# Patient Record
Sex: MALE | Race: WHITE | HISPANIC OR LATINO | Employment: FULL TIME | URBAN - METROPOLITAN AREA
[De-identification: names, ages, dates, MRNs, and addresses within clinical notes are randomized per-mention and may not be internally consistent; named-entity substitution may affect disease eponyms.]

---

## 2018-09-21 ENCOUNTER — HOSPITAL ENCOUNTER (OUTPATIENT)
Dept: CARDIOLOGY | Facility: HOSPITAL | Age: 23
Discharge: HOME | End: 2018-09-21
Attending: INTERNAL MEDICINE

## 2018-09-21 ENCOUNTER — HOSPITAL ENCOUNTER (OUTPATIENT)
Dept: CARDIOLOGY | Facility: HOSPITAL | Age: 23
Discharge: HOME | End: 2018-09-21
Admitting: INTERNAL MEDICINE

## 2018-09-21 ENCOUNTER — TELEPHONE (OUTPATIENT)
Dept: FAMILY MEDICINE CLINIC | Facility: CLINIC | Age: 23
End: 2018-09-21

## 2018-09-21 ENCOUNTER — OFFICE VISIT (OUTPATIENT)
Dept: FAMILY MEDICINE CLINIC | Facility: CLINIC | Age: 23
End: 2018-09-21

## 2018-09-21 VITALS
DIASTOLIC BLOOD PRESSURE: 76 MMHG | BODY MASS INDEX: 31.26 KG/M2 | HEIGHT: 70 IN | OXYGEN SATURATION: 97 % | HEART RATE: 64 BPM | TEMPERATURE: 98.1 F | RESPIRATION RATE: 16 BRPM | SYSTOLIC BLOOD PRESSURE: 110 MMHG | WEIGHT: 218.4 LBS

## 2018-09-21 VITALS — WEIGHT: 218 LBS | BODY MASS INDEX: 31.21 KG/M2 | HEIGHT: 70 IN

## 2018-09-21 VITALS
OXYGEN SATURATION: 99 % | DIASTOLIC BLOOD PRESSURE: 81 MMHG | HEART RATE: 68 BPM | SYSTOLIC BLOOD PRESSURE: 122 MMHG | RESPIRATION RATE: 16 BRPM

## 2018-09-21 DIAGNOSIS — R06.02 SHORTNESS OF BREATH: ICD-10-CM

## 2018-09-21 DIAGNOSIS — F41.9 ANXIETY: ICD-10-CM

## 2018-09-21 DIAGNOSIS — R07.9 CHEST PAIN, UNSPECIFIED TYPE: ICD-10-CM

## 2018-09-21 DIAGNOSIS — R06.09 OTHER FORM OF DYSPNEA: Primary | ICD-10-CM

## 2018-09-21 DIAGNOSIS — R06.02 SOB (SHORTNESS OF BREATH) ON EXERTION: Primary | ICD-10-CM

## 2018-09-21 DIAGNOSIS — R01.1 MURMUR: ICD-10-CM

## 2018-09-21 PROBLEM — R06.00 DYSPNEA: Status: ACTIVE | Noted: 2018-09-21

## 2018-09-21 LAB
ALBUMIN SERPL-MCNC: 4.9 G/DL (ref 3.5–5.2)
ALBUMIN/GLOB SERPL: 1.6 G/DL
ALP SERPL-CCNC: 79 U/L (ref 39–117)
ALT SERPL W P-5'-P-CCNC: 19 U/L (ref 1–41)
ANION GAP SERPL CALCULATED.3IONS-SCNC: 9.2 MMOL/L
ASCENDING AORTA: 2.8 CM
AST SERPL-CCNC: 18 U/L (ref 1–40)
BH CV ECHO MEAS - ACS: 2.5 CM
BH CV ECHO MEAS - AO MAX PG (FULL): 1.3 MMHG
BH CV ECHO MEAS - AO MAX PG: 4.8 MMHG
BH CV ECHO MEAS - AO MEAN PG (FULL): 1.1 MMHG
BH CV ECHO MEAS - AO MEAN PG: 2.9 MMHG
BH CV ECHO MEAS - AO ROOT AREA (BSA CORRECTED): 1.6
BH CV ECHO MEAS - AO ROOT AREA: 8.9 CM^2
BH CV ECHO MEAS - AO ROOT DIAM: 3.4 CM
BH CV ECHO MEAS - AO V2 MAX: 109.2 CM/SEC
BH CV ECHO MEAS - AO V2 MEAN: 82.6 CM/SEC
BH CV ECHO MEAS - AO V2 VTI: 26.2 CM
BH CV ECHO MEAS - AVA(I,A): 3.3 CM^2
BH CV ECHO MEAS - AVA(I,D): 3.3 CM^2
BH CV ECHO MEAS - AVA(V,A): 4 CM^2
BH CV ECHO MEAS - AVA(V,D): 4 CM^2
BH CV ECHO MEAS - BSA(HAYCOCK): 2.2 M^2
BH CV ECHO MEAS - BSA: 2.2 M^2
BH CV ECHO MEAS - BZI_BMI: 31.3 KILOGRAMS/M^2
BH CV ECHO MEAS - BZI_METRIC_HEIGHT: 177.8 CM
BH CV ECHO MEAS - BZI_METRIC_WEIGHT: 98.9 KG
BH CV ECHO MEAS - EDV(MOD-SP2): 159 ML
BH CV ECHO MEAS - EDV(MOD-SP4): 125 ML
BH CV ECHO MEAS - EDV(TEICH): 165.9 ML
BH CV ECHO MEAS - EF(CUBED): 76.7 %
BH CV ECHO MEAS - EF(MOD-BP): 57 %
BH CV ECHO MEAS - EF(MOD-SP2): 56 %
BH CV ECHO MEAS - EF(MOD-SP4): 58.4 %
BH CV ECHO MEAS - EF(TEICH): 68 %
BH CV ECHO MEAS - ESV(MOD-SP2): 70 ML
BH CV ECHO MEAS - ESV(MOD-SP4): 52 ML
BH CV ECHO MEAS - ESV(TEICH): 53 ML
BH CV ECHO MEAS - FS: 38.5 %
BH CV ECHO MEAS - IVS/LVPW: 1
BH CV ECHO MEAS - IVSD: 0.93 CM
BH CV ECHO MEAS - LAT PEAK E' VEL: 19 CM/SEC
BH CV ECHO MEAS - LV DIASTOLIC VOL/BSA (35-75): 57.7 ML/M^2
BH CV ECHO MEAS - LV MASS(C)D: 212 GRAMS
BH CV ECHO MEAS - LV MASS(C)DI: 97.9 GRAMS/M^2
BH CV ECHO MEAS - LV MAX PG: 3.5 MMHG
BH CV ECHO MEAS - LV MEAN PG: 1.8 MMHG
BH CV ECHO MEAS - LV SYSTOLIC VOL/BSA (12-30): 24 ML/M^2
BH CV ECHO MEAS - LV V1 MAX: 93.6 CM/SEC
BH CV ECHO MEAS - LV V1 MEAN: 64.2 CM/SEC
BH CV ECHO MEAS - LV V1 VTI: 18.5 CM
BH CV ECHO MEAS - LVIDD: 5.8 CM
BH CV ECHO MEAS - LVIDS: 3.6 CM
BH CV ECHO MEAS - LVLD AP2: 9.8 CM
BH CV ECHO MEAS - LVLD AP4: 9.1 CM
BH CV ECHO MEAS - LVLS AP2: 7.8 CM
BH CV ECHO MEAS - LVLS AP4: 7.6 CM
BH CV ECHO MEAS - LVOT AREA (M): 4.5 CM^2
BH CV ECHO MEAS - LVOT AREA: 4.6 CM^2
BH CV ECHO MEAS - LVOT DIAM: 2.4 CM
BH CV ECHO MEAS - LVPWD: 0.93 CM
BH CV ECHO MEAS - MED PEAK E' VEL: 15 CM/SEC
BH CV ECHO MEAS - MV A DUR: 0.11 SEC
BH CV ECHO MEAS - MV A MAX VEL: 58.7 CM/SEC
BH CV ECHO MEAS - MV DEC SLOPE: 491.3 CM/SEC^2
BH CV ECHO MEAS - MV DEC TIME: 0.21 SEC
BH CV ECHO MEAS - MV E MAX VEL: 105.7 CM/SEC
BH CV ECHO MEAS - MV E/A: 1.8
BH CV ECHO MEAS - MV MAX PG: 3.4 MMHG
BH CV ECHO MEAS - MV MEAN PG: 1.3 MMHG
BH CV ECHO MEAS - MV P1/2T MAX VEL: 105.7 CM/SEC
BH CV ECHO MEAS - MV P1/2T: 63 MSEC
BH CV ECHO MEAS - MV V2 MAX: 91.7 CM/SEC
BH CV ECHO MEAS - MV V2 MEAN: 52.2 CM/SEC
BH CV ECHO MEAS - MV V2 VTI: 28.7 CM
BH CV ECHO MEAS - MVA P1/2T LCG: 2.1 CM^2
BH CV ECHO MEAS - MVA(P1/2T): 3.5 CM^2
BH CV ECHO MEAS - MVA(VTI): 3 CM^2
BH CV ECHO MEAS - PA ACC TIME: 0.14 SEC
BH CV ECHO MEAS - PA MAX PG (FULL): 2 MMHG
BH CV ECHO MEAS - PA MAX PG: 4.1 MMHG
BH CV ECHO MEAS - PA PR(ACCEL): 15.6 MMHG
BH CV ECHO MEAS - PA V2 MAX: 100.8 CM/SEC
BH CV ECHO MEAS - PULM A REVS DUR: 0.11 SEC
BH CV ECHO MEAS - PULM A REVS VEL: 29.2 CM/SEC
BH CV ECHO MEAS - PULM DIAS VEL: 63.3 CM/SEC
BH CV ECHO MEAS - PULM S/D: 0.57
BH CV ECHO MEAS - PULM SYS VEL: 36.1 CM/SEC
BH CV ECHO MEAS - PVA(V,A): 2.4 CM^2
BH CV ECHO MEAS - PVA(V,D): 2.4 CM^2
BH CV ECHO MEAS - QP/QS: 0.67
BH CV ECHO MEAS - RAP SYSTOLE: 3 MMHG
BH CV ECHO MEAS - RV MAX PG: 2 MMHG
BH CV ECHO MEAS - RV MEAN PG: 1.2 MMHG
BH CV ECHO MEAS - RV V1 MAX: 71.3 CM/SEC
BH CV ECHO MEAS - RV V1 MEAN: 52.7 CM/SEC
BH CV ECHO MEAS - RV V1 VTI: 16.9 CM
BH CV ECHO MEAS - RVOT AREA: 3.4 CM^2
BH CV ECHO MEAS - RVOT DIAM: 2.1 CM
BH CV ECHO MEAS - RVSP: 14 MMHG
BH CV ECHO MEAS - SI(AO): 107.2 ML/M^2
BH CV ECHO MEAS - SI(CUBED): 68.8 ML/M^2
BH CV ECHO MEAS - SI(LVOT): 39.5 ML/M^2
BH CV ECHO MEAS - SI(MOD-SP2): 41.1 ML/M^2
BH CV ECHO MEAS - SI(MOD-SP4): 33.7 ML/M^2
BH CV ECHO MEAS - SI(TEICH): 52.1 ML/M^2
BH CV ECHO MEAS - SUP REN AO DIAM: 1.7 CM
BH CV ECHO MEAS - SV(AO): 232.1 ML
BH CV ECHO MEAS - SV(CUBED): 148.9 ML
BH CV ECHO MEAS - SV(LVOT): 85.5 ML
BH CV ECHO MEAS - SV(MOD-SP2): 89 ML
BH CV ECHO MEAS - SV(MOD-SP4): 73 ML
BH CV ECHO MEAS - SV(RVOT): 57 ML
BH CV ECHO MEAS - SV(TEICH): 112.9 ML
BH CV ECHO MEAS - TAPSE (>1.6): 2.9 CM2
BH CV ECHO MEAS - TR MAX VEL: 166.5 CM/SEC
BH CV ECHO MEASUREMENTS AVERAGE E/E' RATIO: 6.22
BH CV STRESS BP STAGE 1: NORMAL
BH CV STRESS BP STAGE 2: NORMAL
BH CV STRESS BP STAGE 3: NORMAL
BH CV STRESS BP STAGE 4: NORMAL
BH CV STRESS BP STAGE 5: NORMAL
BH CV STRESS DURATION MIN STAGE 1: 3
BH CV STRESS DURATION MIN STAGE 2: 3
BH CV STRESS DURATION MIN STAGE 3: 3
BH CV STRESS DURATION MIN STAGE 4: 3
BH CV STRESS DURATION MIN STAGE 5: 0
BH CV STRESS DURATION SEC STAGE 1: 0
BH CV STRESS DURATION SEC STAGE 2: 0
BH CV STRESS DURATION SEC STAGE 3: 0
BH CV STRESS DURATION SEC STAGE 4: 0
BH CV STRESS DURATION SEC STAGE 5: 30
BH CV STRESS GRADE STAGE 1: 10
BH CV STRESS GRADE STAGE 2: 12
BH CV STRESS GRADE STAGE 3: 14
BH CV STRESS GRADE STAGE 4: 16
BH CV STRESS GRADE STAGE 5: 18
BH CV STRESS HR STAGE 1: 101
BH CV STRESS HR STAGE 2: 122
BH CV STRESS HR STAGE 3: 140
BH CV STRESS HR STAGE 4: 179
BH CV STRESS HR STAGE 5: 182
BH CV STRESS METS STAGE 1: 5
BH CV STRESS METS STAGE 2: 7.5
BH CV STRESS METS STAGE 3: 10
BH CV STRESS METS STAGE 4: 13.5
BH CV STRESS METS STAGE 5: 15
BH CV STRESS O2 STAGE 1: 99
BH CV STRESS O2 STAGE 2: 99
BH CV STRESS O2 STAGE 3: 98
BH CV STRESS O2 STAGE 5: 95
BH CV STRESS PROTOCOL 1: NORMAL
BH CV STRESS RECOVERY BP: NORMAL MMHG
BH CV STRESS RECOVERY HR: 99 BPM
BH CV STRESS RECOVERY O2: 97 %
BH CV STRESS SPEED STAGE 1: 1.7
BH CV STRESS SPEED STAGE 2: 2.5
BH CV STRESS SPEED STAGE 3: 3.4
BH CV STRESS SPEED STAGE 4: 4.2
BH CV STRESS SPEED STAGE 5: 5
BH CV STRESS STAGE 1: 1
BH CV STRESS STAGE 2: 2
BH CV STRESS STAGE 3: 3
BH CV STRESS STAGE 4: 4
BH CV STRESS STAGE 5: 5
BH CV XLRA - RV BASE: 2.7 CM
BH CV XLRA - TDI S': 17 CM/SEC
BILIRUB SERPL-MCNC: 0.4 MG/DL (ref 0.1–1.2)
BUN BLD-MCNC: 13 MG/DL (ref 6–20)
BUN/CREAT SERPL: 14 (ref 7–25)
CALCIUM SPEC-SCNC: 9.4 MG/DL (ref 8.6–10.5)
CHLORIDE SERPL-SCNC: 101 MMOL/L (ref 98–107)
CO2 SERPL-SCNC: 28.8 MMOL/L (ref 22–29)
CREAT BLD-MCNC: 0.93 MG/DL (ref 0.76–1.27)
D DIMER PPP FEU-MCNC: <0.27 MCGFEU/ML (ref 0–0.49)
D DIMER PPP FEU-MCNC: <0.27 MCGFEU/ML (ref 0–0.49)
ERYTHROCYTE [DISTWIDTH] IN BLOOD BY AUTOMATED COUNT: 13.3 % (ref 4.5–15)
ERYTHROCYTE [SEDIMENTATION RATE] IN BLOOD: 2 MM/HR (ref 0–15)
GFR SERPL CREATININE-BSD FRML MDRD: 99 ML/MIN/1.73
GLOBULIN UR ELPH-MCNC: 3.1 GM/DL
GLUCOSE BLD-MCNC: 82 MG/DL (ref 65–99)
HCT VFR BLD AUTO: 48.8 % (ref 35–60)
HGB BLD-MCNC: 15.7 G/DL (ref 13.5–18)
LEFT ATRIUM VOLUME INDEX: 21 ML/M2
LYMPHOCYTES # BLD AUTO: 2 10*3/MM3 (ref 1.2–3.4)
LYMPHOCYTES NFR BLD AUTO: 35.5 % (ref 21–51)
MAXIMAL PREDICTED HEART RATE: 195 BPM
MCH RBC QN AUTO: 28.4 PG (ref 26.1–33.1)
MCHC RBC AUTO-ENTMCNC: 32.2 G/DL (ref 33–37)
MCV RBC AUTO: 88 FL (ref 80–99)
MONOCYTES # BLD AUTO: 0.4 10*3/MM3 (ref 0.1–0.6)
MONOCYTES NFR BLD AUTO: 8 % (ref 2–9)
NEUTROPHILS # BLD AUTO: 3.1 10*3/MM3 (ref 1.4–6.5)
NEUTROPHILS NFR BLD AUTO: 56.5 % (ref 42–75)
NT-PROBNP SERPL-MCNC: 8 PG/ML (ref 5–450)
PERCENT MAX PREDICTED HR: 93.33 %
PLATELET # BLD AUTO: 265 10*3/MM3 (ref 150–450)
PMV BLD AUTO: 7.5 FL (ref 7.1–10.5)
POTASSIUM BLD-SCNC: 4.6 MMOL/L (ref 3.5–5.2)
PROT SERPL-MCNC: 8 G/DL (ref 6–8.5)
RBC # BLD AUTO: 5.54 10*6/MM3 (ref 4–6)
SINUS: 3.6 CM
SODIUM BLD-SCNC: 139 MMOL/L (ref 136–145)
STJ: 2.4 CM
STRESS BASELINE BP: NORMAL MMHG
STRESS BASELINE HR: 73 BPM
STRESS O2 SAT REST: 99 %
STRESS PERCENT HR: 110 %
STRESS POST ESTIMATED WORKLOAD: 14 METS
STRESS POST EXERCISE DUR MIN: 12 MIN
STRESS POST EXERCISE DUR SEC: 30 SEC
STRESS POST O2 SAT PEAK: 95 %
STRESS POST PEAK BP: NORMAL MMHG
STRESS POST PEAK HR: 182 BPM
STRESS TARGET HR: 166 BPM
TROPONIN T SERPL-MCNC: <0.01 NG/ML (ref 0–0.03)
TSH SERPL DL<=0.05 MIU/L-ACNC: 0.79 MIU/ML (ref 0.27–4.2)
WBC NRBC COR # BLD: 5.5 10*3/MM3 (ref 4.5–10)

## 2018-09-21 PROCEDURE — 84484 ASSAY OF TROPONIN QUANT: CPT | Performed by: INTERNAL MEDICINE

## 2018-09-21 PROCEDURE — 99244 OFF/OP CNSLTJ NEW/EST MOD 40: CPT | Performed by: INTERNAL MEDICINE

## 2018-09-21 PROCEDURE — 83880 ASSAY OF NATRIURETIC PEPTIDE: CPT | Performed by: INTERNAL MEDICINE

## 2018-09-21 PROCEDURE — 99214 OFFICE O/P EST MOD 30 MIN: CPT | Performed by: NURSE PRACTITIONER

## 2018-09-21 PROCEDURE — 94760 N-INVAS EAR/PLS OXIMETRY 1: CPT

## 2018-09-21 PROCEDURE — 93018 CV STRESS TEST I&R ONLY: CPT | Performed by: INTERNAL MEDICINE

## 2018-09-21 PROCEDURE — 85025 COMPLETE CBC W/AUTO DIFF WBC: CPT | Performed by: NURSE PRACTITIONER

## 2018-09-21 PROCEDURE — 36415 COLL VENOUS BLD VENIPUNCTURE: CPT | Performed by: NURSE PRACTITIONER

## 2018-09-21 PROCEDURE — 80053 COMPREHEN METABOLIC PANEL: CPT | Performed by: NURSE PRACTITIONER

## 2018-09-21 PROCEDURE — 93017 CV STRESS TEST TRACING ONLY: CPT

## 2018-09-21 PROCEDURE — 93306 TTE W/DOPPLER COMPLETE: CPT | Performed by: INTERNAL MEDICINE

## 2018-09-21 PROCEDURE — 85652 RBC SED RATE AUTOMATED: CPT | Performed by: NURSE PRACTITIONER

## 2018-09-21 PROCEDURE — 93016 CV STRESS TEST SUPVJ ONLY: CPT | Performed by: INTERNAL MEDICINE

## 2018-09-21 PROCEDURE — 93306 TTE W/DOPPLER COMPLETE: CPT

## 2018-09-21 PROCEDURE — 71046 X-RAY EXAM CHEST 2 VIEWS: CPT | Performed by: NURSE PRACTITIONER

## 2018-09-21 PROCEDURE — 36415 COLL VENOUS BLD VENIPUNCTURE: CPT

## 2018-09-21 PROCEDURE — 93000 ELECTROCARDIOGRAM COMPLETE: CPT | Performed by: NURSE PRACTITIONER

## 2018-09-21 PROCEDURE — 85379 FIBRIN DEGRADATION QUANT: CPT | Performed by: NURSE PRACTITIONER

## 2018-09-21 PROCEDURE — 84443 ASSAY THYROID STIM HORMONE: CPT | Performed by: NURSE PRACTITIONER

## 2018-09-21 PROCEDURE — 85379 FIBRIN DEGRADATION QUANT: CPT | Performed by: INTERNAL MEDICINE

## 2018-09-21 RX ORDER — NITROGLYCERIN 0.4 MG/1
0.4 TABLET SUBLINGUAL
Status: DISCONTINUED | OUTPATIENT
Start: 2018-09-21 | End: 2022-11-15 | Stop reason: ALTCHOICE

## 2018-09-21 RX ORDER — SODIUM CHLORIDE 0.9 % (FLUSH) 0.9 %
10 SYRINGE (ML) INJECTION AS NEEDED
Status: DISCONTINUED | OUTPATIENT
Start: 2018-09-21 | End: 2022-11-15 | Stop reason: ALTCHOICE

## 2018-09-21 NOTE — PROGRESS NOTES
Procedure     ECG 12 Lead  Date/Time: 9/21/2018 2:29 PM  Performed by: SOLE MUJICA  Authorized by: SOLE MUJICA   Previous ECG: no previous ECG available  Rhythm: sinus bradycardia  Rate: bradycardic  ST Depression: V2  T depression: III, aVR and V1  QRS axis: normal  Clinical impression: non-specific ECG

## 2018-09-21 NOTE — PROGRESS NOTES
"Date of Office Visit: 2018  Encounter Provider: Juanjo Zhang MD  Place of Service: [facility] CARDIOLOGY  Patient Name: Tim Cam  :1995    Chief Complaint   Patient presents with   • Shortness of Breath     l2ejdit. nonexerional. x1 episode sharp painin right chest.    :     HPI: Tim Cam is a 23 y.o. male who presents today in consultation for dyspnea and chest pain at the request of ALEJO Poe.  He is a healthy young man with no medical problems.  He works full time at [employer].  He doesn't smoke.  His mother is with him and reports no major illnesses as a child.      For a month he has had a feeling where he feels like he needs to take a deep breath and has a \"bubble\" in his chest.  Sometimes he has to breathe deeply in or out, and sometimes he has to yawn.  It can occur at rest or with activity.  He had one episode of sharp right sided chest pain that lasted a few minutes but has not had any exertional chest pain.      He denies palpitations, syncope, edema, orthopnea, or lightheadedness.     A murmur was reportedly heard today.     History reviewed. No pertinent past medical history.    Past Surgical History:   Procedure Laterality Date   • NO PAST SURGERIES         Social History     Social History   • Marital status: Single     Spouse name: N/A   • Number of children: N/A   • Years of education: N/A     Occupational History   • Not on file.     Social History Main Topics   • Smoking status: Never Smoker   • Smokeless tobacco: Never Used   • Alcohol use Yes      Comment: occasionally   • Drug use: No   • Sexual activity: Defer     Other Topics Concern   • Not on file     Social History Narrative   • No narrative on file       Family History   Problem Relation Age of Onset   • No Known Problems Mother    • Hypertension Father    • No Known Problems Brother    • Lung cancer Maternal Grandmother    • COPD Maternal Grandmother    • COPD Maternal " Grandfather    • No Known Problems Paternal Grandmother    • COPD Paternal Grandfather    • No Known Problems Brother    • No Known Problems Brother        Review of Systems   Cardiovascular: Positive for chest pain.   Respiratory: Positive for shortness of breath.    All other systems reviewed and are negative.      No Known Allergies    No current outpatient prescriptions on file.    Current Facility-Administered Medications:   •  nitroglycerin (NITROSTAT) SL tablet 0.4 mg, 0.4 mg, Sublingual, Q5 Min PRN, Juanjo Zhang MD  •  nitroglycerin (NITROSTAT) SL tablet 0.4 mg, 0.4 mg, Sublingual, Q5 Min PRN, Juanjo Zhang MD  •  sodium chloride 0.9 % flush 10 mL, 10 mL, Intravenous, PRVicente PATEL Jamie D, MD  •  sodium chloride 0.9 % flush 10 mL, 10 mL, Intravenous, PRVicente PATEL Jamie D, MD      Objective:     Vitals:    09/21/18 1517 09/21/18 1519   BP: 124/74 122/81   BP Location: Left arm Right arm   Patient Position: Sitting Sitting   Pulse: 68    Resp: 16    SpO2: 99%      There is no height or weight on file to calculate BMI.    Physical Exam   Constitutional: He is oriented to person, place, and time. He appears well-developed and well-nourished.   HENT:   Head: Normocephalic.   Nose: Nose normal.   Mouth/Throat: Oropharynx is clear and moist.   Eyes: Pupils are equal, round, and reactive to light. Conjunctivae and EOM are normal.   Neck: Normal range of motion. No JVD present.   Cardiovascular: Normal rate, regular rhythm, normal heart sounds and intact distal pulses.    No murmur heard.  Pulmonary/Chest: Effort normal and breath sounds normal.   Abdominal: Soft. He exhibits no mass. There is no tenderness.   Musculoskeletal: Normal range of motion. He exhibits no edema.   Lymphadenopathy:     He has no cervical adenopathy.   Neurological: He is alert and oriented to person, place, and time. No cranial nerve deficit.   Skin: Skin is warm and dry. No rash noted.   Psychiatric: He has a normal mood and affect. His  "behavior is normal. Judgment and thought content normal.   Vitals reviewed.      Procedures -- reviewed EKG from outside office, NSR, normal EKG, no prior for comparison      Assessment:       Diagnosis Plan   1. Other form of dyspnea     2. Chest pain, unspecified type  Adult Transthoracic Echo Complete W/ Cont if Necessary Per Protocol    Treadmill Stress Test          Plan:       His exam and EKG are completely normal.  No murmur is appreciated.  He exercised for 12.5 minutes of a Lan protocol without EKG changes or symptoms.  An echo is completely normal.  His labs are all normal. The CMP is pending but I expect it will be normal as well.    This sensation of needing to yawn or deep breathe, and of the \"bubble\" is almost never pathologic and is essentially never cardiac.  He can see us as needed.    Sincerely,       Juanjo Zhang MD                "

## 2018-09-21 NOTE — PATIENT INSTRUCTIONS
Food Choices for Gastroesophageal Reflux Disease, Adult  When you have gastroesophageal reflux disease (GERD), the foods you eat and your eating habits are very important. Choosing the right foods can help ease your discomfort.  What guidelines do I need to follow?  · Choose fruits, vegetables, whole grains, and low-fat dairy products.  · Choose low-fat meat, fish, and poultry.  · Limit fats such as oils, salad dressings, butter, nuts, and avocado.  · Keep a food diary. This helps you identify foods that cause symptoms.  · Avoid foods that cause symptoms. These may be different for everyone.  · Eat small meals often instead of 3 large meals a day.  · Eat your meals slowly, in a place where you are relaxed.  · Limit fried foods.  · Cook foods using methods other than frying.  · Avoid drinking alcohol.  · Avoid drinking large amounts of liquids with your meals.  · Avoid bending over or lying down until 2-3 hours after eating.  What foods are not recommended?  These are some foods and drinks that may make your symptoms worse:  Vegetables  Tomatoes. Tomato juice. Tomato and spaghetti sauce. Chili peppers. Onion and garlic. Horseradish.  Fruits  Oranges, grapefruit, and lemon (fruit and juice).  Meats  High-fat meats, fish, and poultry. This includes hot dogs, ribs, ham, sausage, salami, and arrington.  Dairy  Whole milk and chocolate milk. Sour cream. Cream. Butter. Ice cream. Cream cheese.  Drinks  Coffee and tea. Bubbly (carbonated) drinks or energy drinks.  Condiments  Hot sauce. Barbecue sauce.  Sweets/Desserts  Chocolate and cocoa. Donuts. Peppermint and spearmint.  Fats and Oils  High-fat foods. This includes French fries and potato chips.  Other  Vinegar. Strong spices. This includes black pepper, white pepper, red pepper, cayenne, villalobos powder, cloves, ginger, and chili powder.  The items listed above may not be a complete list of foods and drinks to avoid. Contact your dietitian for more information.  This  information is not intended to replace advice given to you by your health care provider. Make sure you discuss any questions you have with your health care provider.  Document Released: 06/18/2013 Document Revised: 05/25/2017 Document Reviewed: 10/22/2014  [employer] Patient Education © 2017 [employer]        ekg today,   cxr today will call with results.   Labs today will call with results.   Patient sent to chest [facility] unit at [facility] at this time for abnormal ekg, SOA on exertion, report called, copy of ekg and cxr given to patient.   If any worsening symptoms advised to go to the ER.   Can consider OTC ppi if needed.   F/u if symptoms persist   Low acid diet, sit upright 1 hour after meals.   Increase fluid intake, get plenty of rest.   Patient agrees with plan of care and understands instructions. Call if worsening symptoms or any problems or concerns.

## 2018-09-21 NOTE — PROGRESS NOTES
"Vianney Cam is a 23 y.o. male.     History of Present Illness   Here today to reestablish care, c/o SOA, noticed while working, feels \" bubble\" in chest, then feels like he needs to take a breath, comes and goes, he has pain lower right chest, started about 1 month ago, he has not had recent pcp ,he does not take medications. He denies hx of asthma. He works at [employer]. Denies smoking. He does have heartburn after eating spicy foods, he eats fast. He denies wheezing. He states he feels anxious at times when SOA presents, he had adhd as a child, his mom is in the room with him today. He does have cough, states cough helps, comes and goes. He denies inspiratory pain. Denies palpitations. He states symptoms usually at work when he is hot. Also has at home sometimes.       The following portions of the patient's history were reviewed and updated as appropriate: allergies, current medications, past family history, past medical history, past social history, past surgical history and problem list.    Review of Systems   Constitutional: Negative for chills, diaphoresis and fever.   Respiratory: Positive for cough and shortness of breath. Negative for wheezing.    Cardiovascular: Negative for chest pain, palpitations and leg swelling.   Gastrointestinal: Positive for GERD.   Musculoskeletal: Negative for arthralgias and myalgias.   Skin: Negative for pallor.   Neurological: Negative for dizziness, light-headedness and headache.   All other systems reviewed and are negative.      Objective   Physical Exam   Constitutional: He is oriented to person, place, and time. He appears well-developed and well-nourished.   HENT:   Head: Normocephalic.   Right Ear: Tympanic membrane and ear canal normal.   Left Ear: Tympanic membrane and ear canal normal.   Nose: Nose normal.   Mouth/Throat: Uvula is midline, oropharynx is clear and moist and mucous membranes are normal.   Eyes: Pupils are equal, round, and reactive to light. "   Neck: Normal range of motion. No thyromegaly present.   Cardiovascular: Normal rate, regular rhythm, normal heart sounds and intact distal pulses.    Pulses:       Radial pulses are 2+ on the right side, and 2+ on the left side.        Dorsalis pedis pulses are 2+ on the right side, and 2+ on the left side.        Posterior tibial pulses are 2+ on the right side, and 2+ on the left side.   Pulmonary/Chest: Effort normal and breath sounds normal. No respiratory distress. He has no decreased breath sounds. He has no wheezes. He has no rhonchi. He has no rales.   Abdominal: Soft. Normal appearance and bowel sounds are normal. He exhibits no distension and no mass. There is no hepatosplenomegaly. There is no tenderness. There is no rigidity and no guarding.   Musculoskeletal: Normal range of motion.   Lymphadenopathy:     He has no cervical adenopathy.   Neurological: He is alert and oriented to person, place, and time.   Skin: Skin is warm and dry.   Psychiatric: He has a normal mood and affect. His behavior is normal.   Nursing note and vitals reviewed.    cxr 2v in office for SOA, cough, no comparison, shows NAD,awaiting radiology over read.     Assessment/Plan   Tim was seen today for breathing problem.    Diagnoses and all orders for this visit:    SOB (shortness of breath) on exertion  -     XR Chest 2 View  -     CBC & Differential  -     Comprehensive metabolic panel  -     TSH  -     CBC Auto Differential  -     D-dimer, Quantitative  -     Sedimentation Rate  -     ECG 12 Lead    Anxiety  -     XR Chest 2 View  -     CBC & Differential  -     Comprehensive metabolic panel  -     TSH  -     CBC Auto Differential  -     D-dimer, Quantitative  -     Sedimentation Rate    Murmur  -     Cancel: Adult Transthoracic Echo Complete W/ Cont if Necessary Per Protocol; Future  -     ECG 12 Lead        ekg today,   cxr today will call with results.   Labs today will call with results.   Patient sent to chest pain unit  at [facility] at this time for abnormal ekg, SOA on exertion, report called, copy of ekg and cxr given to patient.   If any worsening symptoms advised to go to the ER.   Can consider OTC ppi if needed.   F/u if symptoms persist   Low acid diet, sit upright 1 hour after meals.   Increase fluid intake, get plenty of rest.   Patient agrees with plan of care and understands instructions. Call if worsening symptoms or any problems or concerns.

## 2018-09-21 NOTE — TELEPHONE ENCOUNTER
----- Message from ALEJO Rashid sent at 9/21/2018  4:15 PM EDT -----  Please call patient with results. D dimer is normal.    Pt's mom informed of lab results

## 2018-09-24 ENCOUNTER — TELEPHONE (OUTPATIENT)
Dept: FAMILY MEDICINE CLINIC | Facility: CLINIC | Age: 23
End: 2018-09-24

## 2018-09-24 NOTE — TELEPHONE ENCOUNTER
[facility]    ----- Message from ALEJO Rashid sent at 9/21/2018  4:15 PM EDT -----  Please call patient with results. D dimer is normal.

## 2018-09-25 ENCOUNTER — TELEPHONE (OUTPATIENT)
Dept: FAMILY MEDICINE CLINIC | Facility: CLINIC | Age: 23
End: 2018-09-25

## 2018-09-25 NOTE — TELEPHONE ENCOUNTER
----- Message from ALEJO Rashid sent at 9/24/2018  8:05 AM EDT -----  Please call patient with results. Reviewed his cardiology appt notes, WNL. F/u if symptoms persist.    Pt informed

## 2018-09-25 NOTE — TELEPHONE ENCOUNTER
----- Message from ALEJO Rashid sent at 9/21/2018  4:15 PM EDT -----  Please call patient with results. D dimer is normal.    Pt informed of lab results

## 2018-09-25 NOTE — TELEPHONE ENCOUNTER
----- Message from ALEJO Rashid sent at 9/24/2018  5:33 PM EDT -----  Please call patient with results. cxr is normal.    Pt informed of CXR results

## 2022-11-18 ENCOUNTER — OFFICE VISIT (OUTPATIENT)
Dept: FAMILY MEDICINE CLINIC | Facility: CLINIC | Age: 27
End: 2022-11-18

## 2022-11-18 ENCOUNTER — HOSPITAL ENCOUNTER (OUTPATIENT)
Dept: GENERAL RADIOLOGY | Facility: HOSPITAL | Age: 27
Discharge: HOME | End: 2022-11-18
Admitting: NURSE PRACTITIONER

## 2022-11-18 VITALS
BODY MASS INDEX: 32.9 KG/M2 | SYSTOLIC BLOOD PRESSURE: 130 MMHG | TEMPERATURE: 97.8 F | OXYGEN SATURATION: 97 % | WEIGHT: 229.8 LBS | HEIGHT: 70 IN | HEART RATE: 83 BPM | DIASTOLIC BLOOD PRESSURE: 82 MMHG

## 2022-11-18 DIAGNOSIS — J20.9 ACUTE BRONCHITIS, UNSPECIFIED ORGANISM: Primary | ICD-10-CM

## 2022-11-18 DIAGNOSIS — J20.9 ACUTE BRONCHITIS, UNSPECIFIED ORGANISM: ICD-10-CM

## 2022-11-18 PROCEDURE — 99213 OFFICE O/P EST LOW 20 MIN: CPT | Performed by: NURSE PRACTITIONER

## 2022-11-18 PROCEDURE — 71046 X-RAY EXAM CHEST 2 VIEWS: CPT

## 2022-11-18 NOTE — PROGRESS NOTES
"Chief Complaint  Establish Care, URI (Diag on Tuesday at Urgent care), Headache, and Cough    Subjective    {Problem List  Visit Diagnosis   Encounters  Notes  Medications  Labs  Result Review Imaging  Media :23}    Tim Cam presents to [facility]  History of Present Illness  Patient presents to the office today to establish care.  Patient has not been seen since 2018.  Patient reporting upper respiratory infection was seen in urgent care on Tuesday, November 15.  He is still complaining of headache and cough.  He was started on Z-Kaleb, steroid, and Bromfed.  Blood pressure is 130/82.  He denies chest pain shortness of air.      Objective   Vital Signs:  /82 (BP Location: Left arm, Patient Position: Sitting, Cuff Size: Adult)   Pulse 83   Temp 97.8 °F (36.6 °C)   Ht 177.8 cm (70\")   Wt 104 kg (229 lb 12.8 oz)   SpO2 97%   BMI 32.97 kg/m²   Estimated body mass index is 32.97 kg/m² as calculated from the following:    Height as of this encounter: 177.8 cm (70\").    Weight as of this encounter: 104 kg (229 lb 12.8 oz).          Physical Exam  Constitutional:       Appearance: Normal appearance.   HENT:      Right Ear: Tympanic membrane, ear canal and external ear normal. No decreased hearing noted. No drainage or tenderness. No foreign body. Tympanic membrane is not perforated or erythematous.      Left Ear: Tympanic membrane and external ear normal. No decreased hearing noted. No drainage or tenderness. No foreign body. Tympanic membrane is not perforated or erythematous.      Nose: Nasal tenderness, mucosal edema, congestion and rhinorrhea present.      Right Turbinates: Not enlarged.      Left Turbinates: Not enlarged.      Right Sinus: Maxillary sinus tenderness and frontal sinus tenderness present.      Left Sinus: Maxillary sinus tenderness and frontal sinus tenderness present.      Mouth/Throat:      Mouth: Mucous membranes are moist.      Pharynx: " Oropharyngeal exudate and posterior oropharyngeal erythema present.   Eyes:      General:         Right eye: No discharge.         Left eye: No discharge.      Conjunctiva/sclera: Conjunctivae normal.   Cardiovascular:      Rate and Rhythm: Normal rate and regular rhythm.      Pulses: Normal pulses.      Heart sounds: Normal heart sounds. No murmur heard.  Pulmonary:      Breath sounds: Normal breath sounds. No wheezing or rhonchi.   Chest:      Chest wall: No tenderness.   Musculoskeletal:      Cervical back: No rigidity or tenderness.   Skin:     General: Skin is warm and dry.      Coloration: Skin is not pale.      Findings: No erythema.   Neurological:      Mental Status: He is alert.        Result Review :                Assessment and Plan   Diagnoses and all orders for this visit:    1. Acute bronchitis, unspecified organism (Primary)  -     XR Chest PA & Lateral; Future    Send patient for chest x-ray related to persistent cough.  Patient is taking medication as prescribed.  Advised patient if he develops any increased shortness of air worsening symptoms to go to the ER.         Follow Up   No follow-ups on file.  Patient was given instructions and counseling regarding his condition or for health maintenance advice. Please see specific information pulled into the AVS if appropriate.

## 2024-05-15 ENCOUNTER — OFFICE VISIT (OUTPATIENT)
Dept: FAMILY MEDICINE CLINIC | Facility: CLINIC | Age: 29
End: 2024-05-15
Payer: COMMERCIAL

## 2024-05-15 VITALS
WEIGHT: 233 LBS | OXYGEN SATURATION: 99 % | SYSTOLIC BLOOD PRESSURE: 138 MMHG | HEIGHT: 71 IN | HEART RATE: 71 BPM | DIASTOLIC BLOOD PRESSURE: 88 MMHG | BODY MASS INDEX: 32.62 KG/M2 | TEMPERATURE: 98 F

## 2024-05-15 DIAGNOSIS — N39.44 NOCTURNAL ENURESIS: Primary | ICD-10-CM

## 2024-05-15 DIAGNOSIS — Z00.00 ENCOUNTER FOR PREVENTIVE HEALTH EXAMINATION: ICD-10-CM

## 2024-05-15 DIAGNOSIS — N39.41 URGE INCONTINENCE: ICD-10-CM

## 2024-05-15 PROBLEM — R07.9 CHEST PAIN: Status: RESOLVED | Noted: 2018-09-21 | Resolved: 2024-05-15

## 2024-05-15 PROBLEM — L20.84 INTRINSIC ECZEMA: Status: ACTIVE | Noted: 2024-05-15

## 2024-05-15 PROBLEM — L40.9 PSORIASIS: Status: ACTIVE | Noted: 2024-05-15

## 2024-05-15 PROBLEM — R06.00 DYSPNEA: Status: RESOLVED | Noted: 2018-09-21 | Resolved: 2024-05-15

## 2024-05-15 PROCEDURE — 99213 OFFICE O/P EST LOW 20 MIN: CPT | Performed by: STUDENT IN AN ORGANIZED HEALTH CARE EDUCATION/TRAINING PROGRAM

## 2024-05-15 NOTE — PROGRESS NOTES
"Chief Complaint  Urinary Frequency (Pt is here today pt presents urinary problems at bedtime, pt has not been able to wake up to make it to the restroom causing him to urinate in bed.)    Vianney Cam presents to [facility]  History of Present Illness  28-year-old white male who usually follows ALEJO Kirk here for complaint of chronic nocturnal enuresis.    Pt c/o childhood age 6 years pt c/o bedwetting. Pt c/o he has nocturnal urinary incontinence while asleep in bed atleast once a week.  Pt c/o urge incontinence sx where he looses urine if he doesn't go to the Bathroom within 15 min of urge for urination.  Pt denied personal or FMHx of Diabetes.      Patient reports chest pain and shortness of breath that was present on previous visit has completely resolved now.  Urinary Frequency   Associated symptoms include frequency.       Objective   Vital Signs:  /88   Pulse 71   Temp 98 °F (36.7 °C)   Ht 180.3 cm (71\")   Wt 106 kg (233 lb)   SpO2 99%   BMI 32.50 kg/m²   Estimated body mass index is 32.5 kg/m² as calculated from the following:    Height as of this encounter: 180.3 cm (71\").    Weight as of this encounter: 106 kg (233 lb).       BMI is >= 30 and <35. (Class 1 Obesity). The following options were offered after discussion;: exercise counseling/recommendations and nutrition counseling/recommendations      Physical Exam  Constitutional:       Appearance: Normal appearance.   HENT:      Head: Normocephalic and atraumatic.   Eyes:      Conjunctiva/sclera: Conjunctivae normal.   Cardiovascular:      Rate and Rhythm: Normal rate and regular rhythm.      Heart sounds: Normal heart sounds.   Pulmonary:      Effort: Pulmonary effort is normal.      Breath sounds: Normal breath sounds.   Abdominal:      General: Bowel sounds are normal.      Palpations: Abdomen is soft.      Comments: Non-tender   Skin:     General: Skin is warm.   Neurological: "      General: No focal deficit present.      Mental Status: He is alert and oriented to person, place, and time.   Psychiatric:         Mood and Affect: Mood normal.         Behavior: Behavior normal.        Result Review :    The following data was reviewed by: ALEJO Montaño on 05/15/2024:                             Assessment and Plan     Diagnoses and all orders for this visit:    1. Nocturnal enuresis (Primary)  -     Urinalysis With Microscopic - Urine, Clean Catch  -     Ambulatory Referral to Urology    2. Urge incontinence  -     Urinalysis With Microscopic - Urine, Clean Catch  -     Ambulatory Referral to Urology    3. Encounter for preventive health examination  -     CBC & Differential  -     Comprehensive Metabolic Panel      Instructed patient to Return to Office as needed for persistent or new symptoms and/or go to ER for worsening symptoms, patient voiced understanding.   Instructed patient follow-up PCP for any other concerns.       Follow Up     Return in about 8 weeks (around 7/10/2024) for Next scheduled follow up.  Patient was given instructions and counseling regarding his condition or for health maintenance advice. Please see specific information pulled into the AVS if appropriate.       Answers submitted by the patient for this visit:  Primary Reason for Visit (Submitted on 5/14/2024)  What is the primary reason for your visit?: Other  Other (Submitted on 5/14/2024)  Please describe your symptoms.: I can not stop wetting the bed, it is atleast once a week soemtimes twice  Have you had these symptoms before?: Yes  How long have you been having these symptoms?: Greater than 2 weeks  Please list any medications you are currently taking for this condition.: None  Please describe any probable cause for these symptoms. : No idea

## 2024-05-16 LAB
ALBUMIN SERPL-MCNC: 4.8 G/DL (ref 4.3–5.2)
ALBUMIN/GLOB SERPL: 2.2 {RATIO} (ref 1.2–2.2)
ALP SERPL-CCNC: 83 IU/L (ref 44–121)
ALT SERPL-CCNC: 28 IU/L (ref 0–44)
APPEARANCE UR: CLEAR
AST SERPL-CCNC: 22 IU/L (ref 0–40)
BACTERIA #/AREA URNS HPF: NORMAL /[HPF]
BASOPHILS # BLD AUTO: 0.1 X10E3/UL (ref 0–0.2)
BASOPHILS NFR BLD AUTO: 1 %
BILIRUB SERPL-MCNC: <0.2 MG/DL (ref 0–1.2)
BILIRUB UR QL STRIP: NEGATIVE
BUN SERPL-MCNC: 13 MG/DL (ref 6–20)
BUN/CREAT SERPL: 17 (ref 9–20)
CALCIUM SERPL-MCNC: 9.5 MG/DL (ref 8.7–10.2)
CASTS URNS QL MICRO: NORMAL /LPF
CHLORIDE SERPL-SCNC: 104 MMOL/L (ref 96–106)
CO2 SERPL-SCNC: 23 MMOL/L (ref 20–29)
COLOR UR: YELLOW
CREAT SERPL-MCNC: 0.77 MG/DL (ref 0.76–1.27)
EGFRCR SERPLBLD CKD-EPI 2021: 124 ML/MIN/1.73
EOSINOPHIL # BLD AUTO: 0.3 X10E3/UL (ref 0–0.4)
EOSINOPHIL NFR BLD AUTO: 4 %
EPI CELLS #/AREA URNS HPF: NORMAL /HPF (ref 0–10)
ERYTHROCYTE [DISTWIDTH] IN BLOOD BY AUTOMATED COUNT: 13.3 % (ref 11.6–15.4)
GLOBULIN SER CALC-MCNC: 2.2 G/DL (ref 1.5–4.5)
GLUCOSE SERPL-MCNC: 92 MG/DL (ref 70–99)
GLUCOSE UR QL STRIP: NEGATIVE
HCT VFR BLD AUTO: 42.4 % (ref 37.5–51)
HGB BLD-MCNC: 14.1 G/DL (ref 13–17.7)
HGB UR QL STRIP: NEGATIVE
IMM GRANULOCYTES # BLD AUTO: 0 X10E3/UL (ref 0–0.1)
IMM GRANULOCYTES NFR BLD AUTO: 0 %
KETONES UR QL STRIP: NEGATIVE
LEUKOCYTE ESTERASE UR QL STRIP: NEGATIVE
LYMPHOCYTES # BLD AUTO: 2.5 X10E3/UL (ref 0.7–3.1)
LYMPHOCYTES NFR BLD AUTO: 31 %
MCH RBC QN AUTO: 29 PG (ref 26.6–33)
MCHC RBC AUTO-ENTMCNC: 33.3 G/DL (ref 31.5–35.7)
MCV RBC AUTO: 87 FL (ref 79–97)
MICRO URNS: NORMAL
MICRO URNS: NORMAL
MONOCYTES # BLD AUTO: 0.6 X10E3/UL (ref 0.1–0.9)
MONOCYTES NFR BLD AUTO: 7 %
NEUTROPHILS # BLD AUTO: 4.8 X10E3/UL (ref 1.4–7)
NEUTROPHILS NFR BLD AUTO: 57 %
NITRITE UR QL STRIP: NEGATIVE
PH UR STRIP: 7 [PH] (ref 5–7.5)
PLATELET # BLD AUTO: 279 X10E3/UL (ref 150–450)
POTASSIUM SERPL-SCNC: 4.2 MMOL/L (ref 3.5–5.2)
PROT SERPL-MCNC: 7 G/DL (ref 6–8.5)
PROT UR QL STRIP: NEGATIVE
RBC # BLD AUTO: 4.87 X10E6/UL (ref 4.14–5.8)
RBC #/AREA URNS HPF: NORMAL /HPF (ref 0–2)
SODIUM SERPL-SCNC: 140 MMOL/L (ref 134–144)
SP GR UR STRIP: 1.01 (ref 1–1.03)
UROBILINOGEN UR STRIP-MCNC: 0.2 MG/DL (ref 0.2–1)
WBC # BLD AUTO: 8.2 X10E3/UL (ref 3.4–10.8)
WBC #/AREA URNS HPF: NORMAL /HPF (ref 0–5)